# Patient Record
(demographics unavailable — no encounter records)

---

## 2024-10-23 NOTE — HISTORY OF PRESENT ILLNESS
[de-identified] : SORE THROAT, STOMACH PAIN [FreeTextEntry6] : c/o stomach pain for last few days sore throat since yesterday One episode of diarrhea last week no meds no fever as per mom

## 2024-10-23 NOTE — DISCUSSION/SUMMARY
[FreeTextEntry1] : Treat symptoms as needed Discussed pathophysiology of GE  Clear fluids, advance diet slowly, lactose free diet  Discussed abdominal cramping  Call for blood or mucous in stool, and/or signs or symptoms of dehydration (Reviewed) Call if no better 3-4 days, sooner for concerns/worsening/severe abdominal pain recheck prn  If no po tolerance/develops severe abdominal pain will go to ER  Rapid Strep: Negative Throat Culture sent to lab Treat symptoms with acetaminophen or ibuprofen as needed, encourage po fluids Discussed likely viral illness and expected course Call if no better in 3 days, sooner for change/concerns/worsening recheck prn Phone follow -up after throat culture back Will call if fever last >5 days, worsening or new symptoms  Discussed signs/symptoms that would require immediate care. Parent expressed understanding.

## 2025-04-07 NOTE — HISTORY OF PRESENT ILLNESS
[de-identified] : COUGH, CONGESTION [FreeTextEntry6] : c/o cough and congestion for last few days also c/o wheezing occasional headaches albuterol nebulizer given last night as per dad, was given q 4 hrs yesterday; last tx 330 am (6 hours ago) needs alb sent

## 2025-04-07 NOTE — PHYSICAL EXAM
[Acute Distress] : no acute distress [Alert] : alert [EOMI] : grossly EOMI [Clear Rhinorrhea] : clear rhinorrhea [Supple] : supple [FROM] : full passive range of motion [Clear to Auscultation Bilaterally] : clear to auscultation bilaterally [Wheezing] : no wheezing [Rales] : no rales [Regular Rate and Rhythm] : regular rate and rhythm [Normal S1, S2 audible] : normal S1, S2 audible [Murmur] : no murmur [Soft] : soft [Tender] : nontender [Distended] : nondistended [Normal Bowel Sounds] : normal bowel sounds [Hepatosplenomegaly] : no hepatosplenomegaly [No Abnormal Lymph Nodes Palpated] : no abnormal lymph nodes palpated [Moves All Extremities x 4] : moves all extremities x4 [Warm, Well Perfused x4] : warm, well perfused x4 [Capillary Refill <2s] : capillary refill < 2s [Normotonic] : normotonic [NL] : warm, clear [Warm] : warm [Clear] : clear [FreeTextEntry5] : meka [FreeTextEntry7] : aerating well, no wheeze, no distress

## 2025-04-07 NOTE — DISCUSSION/SUMMARY
[FreeTextEntry1] : 12 yr old with uri/cough/hx of wheezing alb q 6-8 hours increase clear fluids cool mist hum claritin daily notify office if s/s persist or worsen [] : The components of the vaccine(s) to be administered today are listed in the plan of care. The disease(s) for which the vaccine(s) are intended to prevent and the risks have been discussed with the caretaker.  The risks are also included in the appropriate vaccination information statements which have been provided to the patient's caregiver.  The caregiver has given consent to vaccinate.

## 2025-07-30 NOTE — DISCUSSION/SUMMARY
[] : The components of the vaccine(s) to be administered today are listed in the plan of care. The disease(s) for which the vaccine(s) are intended to prevent and the risks have been discussed with the caretaker.  The risks are also included in the appropriate vaccination information statements which have been provided to the patient's caregiver.  The caregiver has given consent to vaccinate. [FreeTextEntry1] : Well 11-12 year old  Discussed growth and development: normal  Discussed safety/anticipatory guidance  Discussed puberty/body changes including breast buds  Discussed need for vaccines, reviewed side effects and VIS Next PE: 1 year continue services  Discussed and/or provided information on the following: PHYSICAL GROWTH AND DEVELOPMENT: Physical and oral health, body image, healthy eating, physical activity SOCIAL AND ACADEMIC COMPETENCE: Connectedness with family, peers, and community; interpersonal relationships; school performance EMOTIONAL WELL-BEING: Coping, mood regulation and mental health (depression), sexuality RISK REDUCTION: Tobacco, alcohol, or other drugs; pregnancy; STIs VIOLENCE AND INJURY PREVENTION: Safety belt and helmet use; substance abuse and riding in a vehicle; guns; interpersonal violence (fights); bullying PHYSICAL ACTIVITY: Adequate physical activity in organized sports, after-school programs, fun activities; limits on screen time

## 2025-07-30 NOTE — PHYSICAL EXAM

## 2025-07-30 NOTE — HISTORY OF PRESENT ILLNESS
[Father] : father [Yes] : Patient goes to dentist yearly [Up to date] : Up to date [Eats meals with family] : eats meals with family [Has family members/adults to turn to for help] : has family members/adults to turn to for help [Is permitted and is able to make independent decisions] : Is permitted and is able to make independent decisions [Sleep Concerns] : no sleep concerns [Grade: ____] : Grade: [unfilled] [Eats regular meals including adequate fruits and vegetables] : eats regular meals including adequate fruits and vegetables [Drinks non-sweetened liquids] : drinks non-sweetened liquids  [Calcium source] : calcium source [Has friends] : has friends [At least 1 hour of physical activity a day] : does not do at least 1 hour of physical activity a day [Screen time (except homework) less than 2 hours a day] : screen time (except homework) less than 2 hours a day [Uses electronic nicotine delivery system] : does not use electronic nicotine delivery system [Exposure to electronic nicotine delivery system] : no exposure to electronic nicotine delivery system [Uses tobacco] : does not use tobacco [Exposure to tobacco] : no exposure to tobacco [Uses drugs] : does not use drugs  [Exposure to drugs] : no exposure to drugs [Drinks alcohol] : does not drink alcohol [Exposure to alcohol] : no exposure to alcohol [Uses safety belts/safety equipment] : uses safety belts/safety equipment  [Has peer relationships free of violence] : has peer relationships free of violence [No] : Patient has not had sexual intercourse [Has ways to cope with stress] : has ways to cope with stress [Displays self-confidence] : displays self-confidence [Has problems with sleep] : does not have problems with sleep [Gets depressed, anxious, or irritable/has mood swings] : does not get depressed, anxious, or irritable/has mood swings [Has thought about hurting self or considered suicide] : has not thought about hurting self or considered suicide [With Teen] : teen [de-identified] : special ed 8 ,math class 12 [NO] : No